# Patient Record
Sex: FEMALE | Race: WHITE | NOT HISPANIC OR LATINO | Employment: UNEMPLOYED | RURAL
[De-identification: names, ages, dates, MRNs, and addresses within clinical notes are randomized per-mention and may not be internally consistent; named-entity substitution may affect disease eponyms.]

---
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
## 2025-04-16 ENCOUNTER — DOCUMENTATION (OUTPATIENT)
Dept: NEUROLOGY | Facility: CLINIC | Age: 20
End: 2025-04-16
Payer: COMMERCIAL

## 2025-04-17 ENCOUNTER — TELEPHONE (OUTPATIENT)
Dept: NEUROLOGY | Facility: CLINIC | Age: 20
End: 2025-04-17
Payer: COMMERCIAL

## 2025-04-17 NOTE — TELEPHONE ENCOUNTER
Called patient and left vm to leave disc at office.    ----- Message from Adela Vicente sent at 4/16/2025  1:47 PM EDT -----  Regarding: call  Please call pt and [facility]

## 2025-06-24 ENCOUNTER — OFFICE VISIT (OUTPATIENT)
Dept: NEUROLOGY | Facility: CLINIC | Age: 20
End: 2025-06-24
Payer: COMMERCIAL

## 2025-06-24 VITALS
SYSTOLIC BLOOD PRESSURE: 112 MMHG | HEIGHT: 67 IN | DIASTOLIC BLOOD PRESSURE: 58 MMHG | HEART RATE: 83 BPM | BODY MASS INDEX: 21.97 KG/M2 | OXYGEN SATURATION: 98 % | WEIGHT: 140 LBS

## 2025-06-24 DIAGNOSIS — M79.2 NEUROPATHIC PAIN: Primary | ICD-10-CM

## 2025-06-24 DIAGNOSIS — M25.551 BILATERAL HIP PAIN: ICD-10-CM

## 2025-06-24 DIAGNOSIS — G43.719 INTRACTABLE CHRONIC MIGRAINE WITHOUT AURA AND WITHOUT STATUS MIGRAINOSUS: ICD-10-CM

## 2025-06-24 DIAGNOSIS — E34.8 PINEAL GLAND CYST: ICD-10-CM

## 2025-06-24 DIAGNOSIS — M25.552 BILATERAL HIP PAIN: ICD-10-CM

## 2025-06-24 DIAGNOSIS — R20.8 DECREASED SENSATION OF LOWER EXTREMITY: ICD-10-CM

## 2025-06-24 DIAGNOSIS — R20.8 DECREASED PERIPHERAL VIBRATORY SENSE: ICD-10-CM

## 2025-06-24 PROCEDURE — 99204 OFFICE O/P NEW MOD 45 MIN: CPT | Performed by: NURSE PRACTITIONER

## 2025-06-24 RX ORDER — SERTRALINE HYDROCHLORIDE 100 MG/1
100 TABLET, FILM COATED ORAL NIGHTLY
COMMUNITY

## 2025-06-24 RX ORDER — AMITRIPTYLINE HYDROCHLORIDE 10 MG/1
10 TABLET ORAL NIGHTLY
Qty: 30 TABLET | Refills: 5 | Status: SHIPPED | OUTPATIENT
Start: 2025-06-24

## 2025-06-24 NOTE — LETTER
2025     Gloria Verdin DO  [address]  [facility] [address]    Patient: Katie Nelson   YOB: 2005   Date of Visit: 2025     Dear Gloria Verdin DO:       Thank you for referring Katie Nelson to me for evaluation. Below are the relevant portions of my assessment and plan of care.    If you have questions, please do not hesitate to call me. I look forward to following Katie along with you.         Sincerely,        Adela Vicente DNP, DAVID        CC: No Recipients    Adela Vicente DNP, DAVID  [phone]  Signed     Neuro Office Visit      Encounter Date: 2025   Patient Name: Katie Nelson  : 2005   MRN: [id number]   PCP: Dr Verdin  Chief Complaint:    Chief Complaint   Patient presents with   • Headache     NP, Mom, Abril is with her for her appt today       History of Present Illness: Katie Nelson is a 19 y.o. female who is here today in Neurology for  headaches    History of Present Illness       Headaches      Headache Symptoms:   Days per month: daily now less severe.   Location: [place]  and jaw pain and neck pain. Bite guard and chiropractor helped w pain  Quality: Pressure        Duration: 20min-2 hours  Severity: 3/10  Triggers: can wake up with a headache  Associated Symptoms: Photophobia and Phonophobia  Aura: none  Visual Changes: none  Hydration: 60-80 oz  Sleep: 8-10  Last eye exam: last week in [facility] with new rx. In [place] some irregular finding.  Birth control: none    Abortives:IBU 800mg now every 2 weeks  Preventives:zoloft, sertraline           On concerta for ADHD    PH   Onset 2025 on winter break. Not stressed, no illness. Returned from [place] a month prior to onset of heaedache.  Just returned from [place] last week.      Abnormal MRI  Pineal Cyst                                                                    IMAGING SCANNED (2025)- sinus cyst vs polyp with 1 cm pineal gland cyst.                Hip Pain with numbness and tingling  Pt with scoliosis which causes bilat hip pain with numbness and tingling radiating to BLE. At times hands and feet will be numb. Can be painful at times      PMH:new onset headaches. Heart murmur, CHERELLE, scoliosis, ADHD  FH:mother w migraine  SH: travels to [place] frequently. In college at [employer]. 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[employer]  Subjective      Past Medical History:   Past Medical History:   Diagnosis Date   • Allergic    • Anxiety    • Attention deficit    • Heart murmur    • Scoliosis     16% as of 2020, followed by christian       Past Surgical History:   Past Surgical History:   Procedure Laterality Date   • ADENOIDECTOMY     • TONSILLECTOMY         Family History:   Family History   Problem Relation Age of Onset   • Migraine headaches Mother        Social History:   Social History     Socioeconomic History   • Marital status: Single   Tobacco Use   • Smoking status: Never     Passive exposure: Never   • Smokeless tobacco: Never   Vaping Use   • Vaping status: Never Used   Substance and Sexual Activity   • Alcohol use: Yes     Comment: socially   • Drug use: Yes     Types: Marijuana     Comment: occasional, last smoked 1 month ago       Medications:     Current Outpatient Medications:   •  cetirizine (zyrTEC) 10 MG tablet, Take 1 tablet by mouth Daily., Disp: , Rfl:   •  Methylphenidate HCl ER 36 MG tablet sustained-release 24 hour, Take 1 tablet by mouth Every Morning. Takes while in school, Disp: , Rfl:   •  montelukast (SINGULAIR) 10 MG tablet, Take 1 tablet by mouth Every Morning., Disp: , Rfl: 1  •  RA ALLERGY RELIEF 180 MG tablet, Take 1 tablet by mouth Every Morning., Disp: , Rfl: 1  •  sertraline (ZOLOFT) 100 MG tablet, Take 1 tablet by mouth Every Night., Disp: , Rfl:   •  amitriptyline (ELAVIL) 10 MG tablet, Take 1 tablet by mouth Every Night., Disp: 30 tablet, Rfl: 5  •  NASACORT ALLERGY 24HR 55 MCG/ACT nasal inhaler, 2 sprays Daily. (Patient not taking:  Reported on 6/24/2025), Disp: , Rfl: 1    Allergies:   Allergies   Allergen Reactions   • Shellfish-Derived Products Swelling       PHQ-9 Total Score:     HUMBLE Fall Risk Assessment has not been completed.    Objective     Physical Exam:   Physical Exam  Eyes:      General: Lids are normal.      Extraocular Movements: EOM normal. No nystagmus.   Neurological:      Coordination: Romberg sign negative.      Deep Tendon Reflexes:      Reflex Scores:       Bicep reflexes are 2+ on the right side and 2+ on the left side.       Brachioradialis reflexes are 2+ on the right side and 2+ on the left side.       Patellar reflexes are 2+ on the right side and 2+ on the left side.       Achilles reflexes are 2+ on the right side and 2+ on the left side.  Psychiatric:         Speech: Speech normal.         Neurological Exam  Mental Status  Awake, alert and oriented to person, place and time. Recent and remote memory are intact. Speech is normal. Follows three-step commands. Attention and concentration are normal. Fund of knowledge is appropriate for level of education.    Cranial Nerves  CN II: Right visual acuity: Finger movement. Left visual acuity: Finger movement. Right normal visual field. Left normal visual field.  CN III, IV, VI: Extraocular movements intact bilaterally. No nystagmus. Normal saccades. Normal lids and orbits bilaterally.   Right pupil: Round.   Left pupil: Round.  CN V: Facial sensation is normal.  CN VII: Full and symmetric facial movement.  CN IX, X: Palate elevates symmetrically  CN XI: Shoulder shrug strength is normal.  CN XII: Tongue midline without atrophy or fasciculations.    Motor  Normal muscle bulk throughout. No fasciculations present. Normal muscle tone. No abnormal involuntary movements. Strength is 5/5 in all four extremities except as noted. No pronator drift.    Sensory  Light touch abnormality: Vibration abnormality:   Decreased sensation RLE. Decreased vibratory sense BLE up to ankles R  ">L.    Reflexes                                            Right                      Left  Brachioradialis                    2+                         2+  Biceps                                 2+                         2+  Patellar                                2+                         2+  Achilles                                2+                         2+    Right pathological reflexes: Ankle clonus absent.  Left pathological reflexes: Ankle clonus absent.    Coordination  Right: Finger-to-nose normal. Rapid alternating movement normal. Heel-to-shin normal.    Gait  Normal casual, toe, heel and tandem gait. Romberg is absent. Able to rise from chair without using arms.     Physical Exam        Vital Signs:   Vitals:    06/24/25 0902   BP: 112/58   Pulse: 83   SpO2: 98%   Weight: 63.5 kg (140 lb)   Height: 170.2 cm (67\")     Body mass index is 21.93 kg/m².         Assessment / Plan      Assessment/Plan:   Diagnoses and all orders for this visit:    1. Neuropathic pain (Primary)  -     EMG & Nerve Conduction Test; Future    2. Bilateral hip pain  -     EMG & Nerve Conduction Test; Future    3. Decreased sensation of lower extremity  -     EMG & Nerve Conduction Test; Future    4. Decreased peripheral vibratory sense  -     EMG & Nerve Conduction Test; Future    5. Intractable chronic migraine without aura and without status migrainosus  -     amitriptyline (ELAVIL) 10 MG tablet; Take 1 tablet by mouth Every Night.  Dispense: 30 tablet; Refill: 5    6. Pineal gland cyst       Assessment & Plan          Patient Education:       Reviewed medications, potential side effects and signs and symptoms to report. Discussed risk versus benefits of treatment plan with patient and/or family-including medications, labs and radiology that may be ordered. Addressed questions and concerns during visit. Patient and/or family verbalized understanding and agree with plan. Instructed to call the office with any questions and " report to ER with any life-threatening symptoms.     Follow Up:   Return in about 3 months (around 9/24/2025) for Recheck.    During this visit the following were done:  Labs Reviewed []    Labs Ordered []    Radiology Reports Reviewed [x]    Radiology Ordered []    PCP Records Reviewed [x]    Referring Provider Records Reviewed []    ER Records Reviewed []    Hospital Records Reviewed []    History Obtained From Family [x]    Radiology Images Reviewed []    Other Reviewed [x]    Records Requested []      Patient or patient representative verbalized consent for the use of Ambient Listening during the visit with  Adela Vicente DNP, APRN for chart documentation. 6/24/2025  08:29 EDT      Adela Vicente DNP, APRN

## 2025-06-24 NOTE — PROGRESS NOTES
Neuro Office Visit      Encounter Date: 2025   Patient Name: Katie Nelson  : 2005   MRN: [id number]   PCP: Dr Verdin  Chief Complaint:    Chief Complaint   Patient presents with    Headache     NP, Mom, Abril is with her for her appt today       History of Present Illness: Katie Nelson is a 19 y.o. female who is here today in Neurology for  headaches    History of Present Illness       Headaches      Headache Symptoms:   Days per month: daily now less severe.   Location: [place]  and jaw pain and neck pain. Bite guard and chiropractor helped w pain  Quality: Pressure        Duration: 20min-2 hours  Severity: 3/10  Triggers: can wake up with a headache  Associated Symptoms: Photophobia and Phonophobia  Aura: none  Visual Changes: none  Hydration: 60-80 oz  Sleep: 8-10  Last eye exam: last week in [facility] with new rx. In [place] some irregular finding.  Birth control: none    Abortives:IBU 800mg now every 2 weeks  Preventives:zoloft, sertraline           On concerta for ADHD    PH   Onset 2025 on winter break. Not stressed, no illness. Returned from [place] a month prior to onset of heaedache.  Just returned from [place] last week.      Abnormal MRI  Pineal Cyst                                                                    IMAGING SCANNED (2025)- sinus cyst vs polyp with 1 cm pineal gland cyst.               Hip Pain with numbness and tingling  Pt with scoliosis which causes bilat hip pain with numbness and tingling radiating to BLE. At times hands and feet will be numb. Can be painful at times      PMH:new onset headaches. Heart murmur, CHERELLE, scoliosis, ADHD  FH:mother w migraine  SH: travels to [place] frequently. 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[employer]   Subjective      Past Medical History:   Past Medical History:   Diagnosis Date    Allergic     Anxiety     Attention deficit     Heart murmur     Scoliosis     16% as of , followed by christian [facility]  Surgical History:   Past Surgical History:   Procedure Laterality Date    ADENOIDECTOMY      TONSILLECTOMY         Family History:   Family History   Problem Relation Age of Onset    Migraine headaches Mother        Social History:   Social History     Socioeconomic History    Marital status: Single   Tobacco Use    Smoking status: Never     Passive exposure: Never    Smokeless tobacco: Never   Vaping Use    Vaping status: Never Used   Substance and Sexual Activity    Alcohol use: Yes     Comment: socially    Drug use: Yes     Types: Marijuana     Comment: occasional, last smoked 1 month ago       Medications:     Current Outpatient Medications:     cetirizine (zyrTEC) 10 MG tablet, Take 1 tablet by mouth Daily., Disp: , Rfl:     Methylphenidate HCl ER 36 MG tablet sustained-release 24 hour, Take 1 tablet by mouth Every Morning. Takes while in school, Disp: , Rfl:     montelukast (SINGULAIR) 10 MG tablet, Take 1 tablet by mouth Every Morning., Disp: , Rfl: 1    RA ALLERGY RELIEF 180 MG tablet, Take 1 tablet by mouth Every Morning., Disp: , Rfl: 1    sertraline (ZOLOFT) 100 MG tablet, Take 1 tablet by mouth Every Night., Disp: , Rfl:     amitriptyline (ELAVIL) 10 MG tablet, Take 1 tablet by mouth Every Night., Disp: 30 tablet, Rfl: 5    NASACORT ALLERGY 24HR 55 MCG/ACT nasal inhaler, 2 sprays Daily. (Patient not taking: Reported on 6/24/2025), Disp: , Rfl: 1    Allergies:   Allergies   Allergen Reactions    Shellfish-Derived Products Swelling       PHQ-9 Total Score:     STEADI Fall Risk Assessment has not been completed.    Objective     Physical Exam:   Physical Exam  Eyes:      General: Lids are normal.      Extraocular Movements: EOM normal. No nystagmus.   Neurological:      Coordination: Romberg sign negative.      Deep Tendon Reflexes:      Reflex Scores:       Bicep reflexes are 2+ on the right side and 2+ on the left side.       Brachioradialis reflexes are 2+ on the right side and 2+ on the left side.        Patellar reflexes are 2+ on the right side and 2+ on the left side.       Achilles reflexes are 2+ on the right side and 2+ on the left side.  Psychiatric:         Speech: Speech normal.         Neurological Exam  Mental Status  Awake, alert and oriented to person, place and time. Recent and remote memory are intact. Speech is normal. Follows three-step commands. Attention and concentration are normal. Fund of knowledge is appropriate for level of education.    Cranial Nerves  CN II: Right visual acuity: Finger movement. Left visual acuity: Finger movement. Right normal visual field. Left normal visual field.  CN III, IV, VI: Extraocular movements intact bilaterally. No nystagmus. Normal saccades. Normal lids and orbits bilaterally.   Right pupil: Round.   Left pupil: Round.  CN V: Facial sensation is normal.  CN VII: Full and symmetric facial movement.  CN IX, X: Palate elevates symmetrically  CN XI: Shoulder shrug strength is normal.  CN XII: Tongue midline without atrophy or fasciculations.    Motor  Normal muscle bulk throughout. No fasciculations present. Normal muscle tone. No abnormal involuntary movements. Strength is 5/5 in all four extremities except as noted. No pronator drift.    Sensory  Light touch abnormality: Vibration abnormality:   Decreased sensation RLE. Decreased vibratory sense BLE up to ankles R >L.    Reflexes                                            Right                      Left  Brachioradialis                    2+                         2+  Biceps                                 2+                         2+  Patellar                                2+                         2+  Achilles                                2+                         2+    Right pathological reflexes: Ankle clonus absent.  Left pathological reflexes: Ankle clonus absent.    Coordination  Right: Finger-to-nose normal. Rapid alternating movement normal. Heel-to-shin normal.    Gait  Normal casual, toe, heel  "and tandem gait. Romberg is absent. Able to rise from chair without using arms.     Physical Exam        Vital Signs:   Vitals:    06/24/25 0902   BP: 112/58   Pulse: 83   SpO2: 98%   Weight: 63.5 kg (140 lb)   Height: 170.2 cm (67\")     Body mass index is 21.93 kg/m².         Assessment / Plan      Assessment/Plan:   Diagnoses and all orders for this visit:    1. Neuropathic pain (Primary)  -     EMG & Nerve Conduction Test; Future    2. Bilateral hip pain  -     EMG & Nerve Conduction Test; Future    3. Decreased sensation of lower extremity  -     EMG & Nerve Conduction Test; Future    4. Decreased peripheral vibratory sense  -     EMG & Nerve Conduction Test; Future    5. Intractable chronic migraine without aura and without status migrainosus  -     amitriptyline (ELAVIL) 10 MG tablet; Take 1 tablet by mouth Every Night.  Dispense: 30 tablet; Refill: 5    6. Pineal gland cyst       Assessment & Plan          Patient Education:       Reviewed medications, potential side effects and signs and symptoms to report. Discussed risk versus benefits of treatment plan with patient and/or family-including medications, labs and radiology that may be ordered. Addressed questions and concerns during visit. Patient and/or family verbalized understanding and agree with plan. Instructed to call the office with any questions and report to ER with any life-threatening symptoms.     Follow Up:   Return in about 3 months (around 9/24/2025) for Recheck.    During this visit the following were done:  Labs Reviewed []    Labs Ordered []    Radiology Reports Reviewed [x]    Radiology Ordered []    PCP Records Reviewed [x]    Referring Provider Records Reviewed []    ER Records Reviewed []    Hospital Records Reviewed []    History Obtained From Family [x]    Radiology Images Reviewed []    Other Reviewed [x]    Records Requested []      Patient or patient representative verbalized consent for the use of Ambient Listening during the " visit with  Adela Vicente DNP, APRN for chart documentation. 6/24/2025  08:29 EDT      Adela Vicente DNP, APRN

## 2025-07-01 ENCOUNTER — DOCUMENTATION (OUTPATIENT)
Dept: NEUROLOGY | Facility: CLINIC | Age: 20
End: 2025-07-01
Payer: COMMERCIAL